# Patient Record
Sex: FEMALE | Race: WHITE | NOT HISPANIC OR LATINO | Employment: FULL TIME | URBAN - METROPOLITAN AREA
[De-identification: names, ages, dates, MRNs, and addresses within clinical notes are randomized per-mention and may not be internally consistent; named-entity substitution may affect disease eponyms.]

---

## 2024-08-18 ENCOUNTER — APPOINTMENT (OUTPATIENT)
Dept: GENERAL RADIOLOGY | Facility: HOSPITAL | Age: 54
End: 2024-08-18
Payer: COMMERCIAL

## 2024-08-18 ENCOUNTER — HOSPITAL ENCOUNTER (EMERGENCY)
Facility: HOSPITAL | Age: 54
Discharge: HOME | End: 2024-08-18
Attending: EMERGENCY MEDICINE | Admitting: EMERGENCY MEDICINE
Payer: COMMERCIAL

## 2024-08-18 VITALS
DIASTOLIC BLOOD PRESSURE: 82 MMHG | WEIGHT: 120 LBS | TEMPERATURE: 99.2 F | RESPIRATION RATE: 16 BRPM | SYSTOLIC BLOOD PRESSURE: 133 MMHG | HEIGHT: 61 IN | BODY MASS INDEX: 22.66 KG/M2 | HEART RATE: 82 BPM | OXYGEN SATURATION: 99 %

## 2024-08-18 DIAGNOSIS — S50.01XA CONTUSION OF RIGHT ELBOW, INITIAL ENCOUNTER: ICD-10-CM

## 2024-08-18 DIAGNOSIS — S20.211A CONTUSION OF RIBS, RIGHT, INITIAL ENCOUNTER: Primary | ICD-10-CM

## 2024-08-18 PROCEDURE — 71101 X-RAY EXAM UNILAT RIBS/CHEST: CPT

## 2024-08-18 PROCEDURE — 99283 EMERGENCY DEPT VISIT LOW MDM: CPT

## 2024-08-18 PROCEDURE — 99283 EMERGENCY DEPT VISIT LOW MDM: CPT | Performed by: EMERGENCY MEDICINE

## 2024-08-18 PROCEDURE — 73080 X-RAY EXAM OF ELBOW: CPT

## 2024-08-18 RX ORDER — HYDROCODONE BITARTRATE AND ACETAMINOPHEN 5; 325 MG/1; MG/1
1 TABLET ORAL ONCE
Status: COMPLETED | OUTPATIENT
Start: 2024-08-18 | End: 2024-08-18

## 2024-08-18 RX ORDER — NAPROXEN 500 MG/1
500 TABLET ORAL 2 TIMES DAILY PRN
Qty: 10 TABLET | Refills: 0 | Status: SHIPPED | OUTPATIENT
Start: 2024-08-18

## 2024-08-18 RX ORDER — BISOPROLOL FUMARATE 5 MG/1
5 TABLET, FILM COATED ORAL DAILY
COMMUNITY

## 2024-08-18 RX ADMIN — HYDROCODONE BITARTRATE AND ACETAMINOPHEN 1 TABLET: 5; 325 TABLET ORAL at 22:25

## 2024-08-18 NOTE — Clinical Note
[facility]  [id number]  [facility] [place] [facility] [id number]    Abbey Guzman was seen and treated in our emergency department on 8/18/2024.  She may return to work on 08/20/2024.         Thank you for choosing [facility].    Kaiser Gentile MD

## 2024-08-19 NOTE — FSED PROVIDER NOTE
Subjective   History of Present Illness  Patient is complaining of right rib pain.  She was walking her dog by tying her leash around her waist the dog saw a bird took off and dragged her to the ground.  The patient has an [place] Dasilva.  Patient has pain to her right elbow and her right ribs.  She is not on blood thinner and she did not hit her head.      Review of Systems   All other systems reviewed and are negative.      History reviewed. No pertinent past medical history.    Allergies   Allergen Reactions    Vortioxetine Itching       History reviewed. No pertinent surgical history.    History reviewed. No pertinent family history.    Social History     Socioeconomic History    Marital status:    Tobacco Use    Smoking status: Never    Smokeless tobacco: Never   Vaping Use    Vaping status: Never Used   Substance and Sexual Activity    Alcohol use: Never           Objective   Physical Exam  Vitals and nursing note reviewed.   Constitutional:       General: She is not in acute distress.     Appearance: Normal appearance. She is not ill-appearing, toxic-appearing or diaphoretic.   HENT:      Head: Normocephalic and atraumatic.      Nose: Nose normal.   Eyes:      Extraocular Movements: Extraocular movements intact.      Pupils: Pupils are equal, round, and reactive to light.   Cardiovascular:      Rate and Rhythm: Normal rate and regular rhythm.      Heart sounds: Normal heart sounds.   Pulmonary:      Effort: No respiratory distress.      Breath sounds: Normal breath sounds. No stridor. No wheezing, rhonchi or rales.      Comments: Patient is splinting on the right side with deep breath  Chest:      Chest wall: Tenderness present.   Abdominal:      General: Bowel sounds are normal.      Palpations: Abdomen is soft.   Musculoskeletal:      Cervical back: Normal range of motion and neck supple.   Skin:     General: Skin is warm and dry.      Capillary Refill: Capillary refill takes less than 2  seconds.      Findings: Rash present.      Comments: Abrasion to right elbow no laceration minimal to no swelling full range of motion no wrist tenderness.   Neurological:      Mental Status: She is alert and oriented to person, place, and time.   Psychiatric:         Mood and Affect: Mood normal.         Procedures           ED Course                                           Medical Decision Making  Patient has negative x-rays no fractures she will be discharged home on naproxen instructions for deep breaths every hour to prevent atelectasis.    Amount and/or Complexity of Data Reviewed  Radiology: ordered.     Details: X-rays do not reveal any fracture of the elbow or the ribs.  The patient will be treated with naproxen will be discharged home she is to ice and elevate her right elbow and she is to take deep breaths to ensure that she does not's cause atelectasis and splinting of her right chest.    Risk  Prescription drug management.        Final diagnoses:   Contusion of ribs, right, initial encounter   Contusion of right elbow, initial encounter       ED Disposition  ED Disposition       ED Disposition   Discharge    Condition   Stable    Comment   --               Provider, No Known  [place] [id number] [phone]    In 1 week           Medication List        New Prescriptions      naproxen 500 MG EC tablet  Commonly known as: EC NAPROSYN  Take 1 tablet by mouth 2 (Two) Times a Day As Needed for Mild Pain.               Where to Get Your Medications        These medications were sent to [facility]/pharmacy #[id number] - [facility], [place] - [address] AT [facility] - [phone] [facility] [phone]   [address], [facility] [address]      Phone: [phone]   naproxen 500 MG EC tablet

## 2024-08-19 NOTE — DISCHARGE INSTRUCTIONS
Place ice for any swelling help prevent more bruising.  Take deep breaths every hour to ensure that you fill your lungs so that you do not develop pneumonia.  Take your naproxen with food.  Follow-up with your doctor.